# Patient Record
Sex: MALE | Race: BLACK OR AFRICAN AMERICAN | NOT HISPANIC OR LATINO | Employment: UNEMPLOYED | URBAN - METROPOLITAN AREA
[De-identification: names, ages, dates, MRNs, and addresses within clinical notes are randomized per-mention and may not be internally consistent; named-entity substitution may affect disease eponyms.]

---

## 2017-04-25 ENCOUNTER — OFFICE VISIT (OUTPATIENT)
Dept: FAMILY MEDICINE | Facility: CLINIC | Age: 32
End: 2017-04-25
Payer: COMMERCIAL

## 2017-04-25 VITALS
DIASTOLIC BLOOD PRESSURE: 68 MMHG | HEART RATE: 86 BPM | TEMPERATURE: 98.2 F | OXYGEN SATURATION: 94 % | HEIGHT: 68 IN | SYSTOLIC BLOOD PRESSURE: 120 MMHG | WEIGHT: 315 LBS | BODY MASS INDEX: 47.74 KG/M2

## 2017-04-25 DIAGNOSIS — L84 CALLUS OF FOOT: ICD-10-CM

## 2017-04-25 DIAGNOSIS — F17.200 TOBACCO USE DISORDER: ICD-10-CM

## 2017-04-25 DIAGNOSIS — R53.83 FATIGUE, UNSPECIFIED TYPE: ICD-10-CM

## 2017-04-25 DIAGNOSIS — E66.01 MORBID OBESITY, UNSPECIFIED OBESITY TYPE (H): ICD-10-CM

## 2017-04-25 DIAGNOSIS — H54.7 VISION PROBLEM: ICD-10-CM

## 2017-04-25 DIAGNOSIS — I83.93 VARICOSE VEINS OF LEGS: ICD-10-CM

## 2017-04-25 DIAGNOSIS — B35.1 ONYCHOMYCOSIS: ICD-10-CM

## 2017-04-25 DIAGNOSIS — Z13.6 CARDIOVASCULAR SCREENING; LDL GOAL LESS THAN 100: ICD-10-CM

## 2017-04-25 DIAGNOSIS — R73.01 IMPAIRED FASTING GLUCOSE: ICD-10-CM

## 2017-04-25 DIAGNOSIS — J45.20 MILD INTERMITTENT ASTHMA WITHOUT COMPLICATION: Primary | ICD-10-CM

## 2017-04-25 LAB
BASOPHILS # BLD AUTO: 0 10E9/L (ref 0–0.2)
BASOPHILS NFR BLD AUTO: 0.3 %
CHOLEST SERPL-MCNC: 158 MG/DL
DIFFERENTIAL METHOD BLD: ABNORMAL
EOSINOPHIL # BLD AUTO: 0.2 10E9/L (ref 0–0.7)
EOSINOPHIL NFR BLD AUTO: 1.7 %
ERYTHROCYTE [DISTWIDTH] IN BLOOD BY AUTOMATED COUNT: 14.7 % (ref 10–15)
HBA1C MFR BLD: 6 % (ref 4.3–6)
HCT VFR BLD AUTO: 44.2 % (ref 40–53)
HDLC SERPL-MCNC: 37 MG/DL
HGB BLD-MCNC: 14.6 G/DL (ref 13.3–17.7)
LDLC SERPL CALC-MCNC: 85 MG/DL
LYMPHOCYTES # BLD AUTO: 3.5 10E9/L (ref 0.8–5.3)
LYMPHOCYTES NFR BLD AUTO: 25.3 %
MCH RBC QN AUTO: 29.1 PG (ref 26.5–33)
MCHC RBC AUTO-ENTMCNC: 33 G/DL (ref 31.5–36.5)
MCV RBC AUTO: 88 FL (ref 78–100)
MONOCYTES # BLD AUTO: 1.1 10E9/L (ref 0–1.3)
MONOCYTES NFR BLD AUTO: 7.9 %
NEUTROPHILS # BLD AUTO: 8.9 10E9/L (ref 1.6–8.3)
NEUTROPHILS NFR BLD AUTO: 64.8 %
NONHDLC SERPL-MCNC: 121 MG/DL
PLATELET # BLD AUTO: 286 10E9/L (ref 150–450)
RBC # BLD AUTO: 5.02 10E12/L (ref 4.4–5.9)
TRIGL SERPL-MCNC: 178 MG/DL
TSH SERPL DL<=0.005 MIU/L-ACNC: 2.38 MU/L (ref 0.4–4)
WBC # BLD AUTO: 13.8 10E9/L (ref 4–11)

## 2017-04-25 PROCEDURE — 80061 LIPID PANEL: CPT | Performed by: FAMILY MEDICINE

## 2017-04-25 PROCEDURE — 36415 COLL VENOUS BLD VENIPUNCTURE: CPT | Performed by: FAMILY MEDICINE

## 2017-04-25 PROCEDURE — 83036 HEMOGLOBIN GLYCOSYLATED A1C: CPT | Performed by: FAMILY MEDICINE

## 2017-04-25 PROCEDURE — 84443 ASSAY THYROID STIM HORMONE: CPT | Performed by: FAMILY MEDICINE

## 2017-04-25 PROCEDURE — 99214 OFFICE O/P EST MOD 30 MIN: CPT | Performed by: FAMILY MEDICINE

## 2017-04-25 PROCEDURE — 85025 COMPLETE CBC W/AUTO DIFF WBC: CPT | Performed by: FAMILY MEDICINE

## 2017-04-25 RX ORDER — ALBUTEROL SULFATE 0.83 MG/ML
2.5 SOLUTION RESPIRATORY (INHALATION)
COMMUNITY
Start: 2011-05-26 | End: 2017-04-25

## 2017-04-25 RX ORDER — ALBUTEROL SULFATE 0.83 MG/ML
1 SOLUTION RESPIRATORY (INHALATION) EVERY 6 HOURS PRN
Qty: 1 BOX | Refills: 11 | Status: SHIPPED | OUTPATIENT
Start: 2017-04-25

## 2017-04-25 RX ORDER — ALBUTEROL SULFATE 90 UG/1
2 AEROSOL, METERED RESPIRATORY (INHALATION) EVERY 6 HOURS PRN
Qty: 1 INHALER | Refills: 11 | Status: SHIPPED | OUTPATIENT
Start: 2017-04-25

## 2017-04-25 RX ORDER — ALBUTEROL SULFATE 90 UG/1
AEROSOL, METERED RESPIRATORY (INHALATION)
COMMUNITY
End: 2017-04-25

## 2017-04-25 ASSESSMENT — PAIN SCALES - GENERAL: PAINLEVEL: NO PAIN (0)

## 2017-04-25 NOTE — MR AVS SNAPSHOT
After Visit Summary   4/25/2017    Dionne Chaves    MRN: [id number]           Patient Information     Date Of Birth          1985        Visit Information        Provider Department      4/25/2017 8:40 AM Florencio Trevino MD [facility]        Today's Diagnoses     Vision problem    -  1    Mild intermittent asthma without complication        Varicose veins of legs        Onychomycosis        Callus of foot        Morbid obesity, unspecified obesity type (H)        Tobacco use disorder        CARDIOVASCULAR SCREENING; LDL GOAL LESS THAN 100        Fatigue, unspecified type        Impaired fasting glucose          Care Instructions    Okay to monitor the varicose veins and the nail problems    Use [facility] board [facility] to file down the foot calluses occasionally    Albuterol as needed    Keep working on healthy diet/exercise and wt loss    We will send you lab results    Smoking cessation        Follow-ups after your visit        Additional Services     OPTOMETRY REFERRAL       Your provider has referred you to:  FMG: [facility] [phone]   [email or web address]      Please be aware that coverage of these services is subject to the terms and limitations of your health insurance plan.  Call member services at your health plan with any benefit or coverage questions.      Please bring the following to your appointment:  >>   Any x-rays, CTs or MRIs which have been performed.  Contact the facility where they were done to arrange for  prior to your scheduled appointment.  Any new CT, MRI or other procedures ordered by your specialist must be performed at a [place] facility or coordinated by your clinic's referral office.    >>   List of current medications   >>   This referral request   >>   Any documents/labs given to you for this referral                  Who to contact     If you have questions or need follow up  "information about today's clinic visit or your schedule please contact [facility] directly at [phone].  Normal or non-critical lab and imaging results will be communicated to you by AgInfoLinkhart, letter or phone within 4 business days after the clinic has received the results. If you do not hear from us within 7 days, please contact the clinic through AgInfoLinkhart or phone. If you have a critical or abnormal lab result, we will notify you by phone as soon as possible.  Submit refill requests through JAZIO or call your pharmacy and they will forward the refill request to us. Please allow 3 business days for your refill to be completed.          Additional Information About Your Visit        AgInfoLinkhart Information     JAZIO lets you send messages to your doctor, view your test results, renew your prescriptions, schedule appointments and more. To sign up, go to [email or web address] . Click on \"Log in\" on the left side of the screen, which will take you to the Welcome page. Then click on \"Sign up Now\" on the right side of the page.     You will be asked to enter the access code listed below, as well as some personal information. Please follow the directions to create your username and password.     Your access code is: [email or web address]  Expires: 2017  9:02 AM     Your access code will  in 90 days. If you need help or a new code, please call your [place] clinic or [phone].        Care EveryWhere ID     This is your Care EveryWhere ID. This could be used by other organizations to access your [place] medical records  [id number]        Your Vitals Were     Pulse Temperature Height Pulse Oximetry BMI (Body Mass Index)       86 98.2  F (36.8  C) (Oral) 5' 7.75\" (1.721 m) 94% 51.01 kg/m2        Blood Pressure from Last 3 Encounters:   17 120/68   16 133/83   16 132/84    Weight from Last 3 Encounters:   17 (!) 333 lb (151 kg)   16 (!) 323 lb (146.5 kg) "   02/23/16 (!) 327 lb 12 oz (148.7 kg)              We Performed the Following     Asthma Action Plan (AAP)     CBC with platelets differential     Hemoglobin A1c     Lipid panel reflex to direct LDL     OPTOMETRY REFERRAL     TSH with free T4 reflex          Today's Medication Changes          These changes are accurate as of: 4/25/17  9:02 AM.  If you have any questions, ask your nurse or doctor.               These medicines have changed or have updated prescriptions.        Dose/Directions    * albuterol (2.5 MG/3ML) 0.083% neb solution   This may have changed:    - how to take this  - when to take this  - reasons to take this   Used for:  Mild intermittent asthma without complication   Changed by:  Florencio Trevino MD        Dose:  1 vial   Take 1 vial (2.5 mg) by nebulization every 6 hours as needed for shortness of breath / dyspnea or wheezing   Quantity:  1 Box   Refills:  11       * albuterol 108 (90 BASE) MCG/ACT Inhaler   Commonly known as:  PROAIR HFA/PROVENTIL HFA/VENTOLIN HFA   This may have changed:    - how much to take  - when to take this  - reasons to take this   Used for:  Mild intermittent asthma without complication   Changed by:  Florencio Trevino MD        Dose:  2 puff   Inhale 2 puffs into the lungs every 6 hours as needed for shortness of breath / dyspnea or wheezing   Quantity:  1 Inhaler   Refills:  11       * Notice:  This list has 2 medication(s) that are the same as other medications prescribed for you. Read the directions carefully, and ask your doctor or other care provider to review them with you.         Where to get your medicines      These medications were sent to [id number] - [place] [id number] AT [place] [address], [facility] [address]     Phone:  [phone]     albuterol (2.5 MG/3ML) 0.083% neb solution    albuterol 108 (90 BASE) MCG/ACT Inhaler                Primary Care Provider Office Phone #    [place]  [facility] [phone]       [address] [place] [address]        Thank you!     Thank you for choosing [facility]  for your care. Our goal is always to provide you with excellent care. Hearing back from our patients is one way we can continue to improve our services. Please take a few minutes to complete the written survey that you may receive in the mail after your visit with us. Thank you!             Your Updated Medication List - Protect others around you: Learn how to safely use, store and throw away your medicines at www.disposemymeds.org.          This list is accurate as of: 4/25/17  9:02 AM.  Always use your most recent med list.                   Brand Name Dispense Instructions for use    * albuterol (2.5 MG/3ML) 0.083% neb solution     1 Box    Take 1 vial (2.5 mg) by nebulization every 6 hours as needed for shortness of breath / dyspnea or wheezing       * albuterol 108 (90 BASE) MCG/ACT Inhaler    PROAIR HFA/PROVENTIL HFA/VENTOLIN HFA    1 Inhaler    Inhale 2 puffs into the lungs every 6 hours as needed for shortness of breath / dyspnea or wheezing       * Notice:  This list has 2 medication(s) that are the same as other medications prescribed for you. Read the directions carefully, and ask your doctor or other care provider to review them with you.

## 2017-04-25 NOTE — LETTER
My Asthma Action Plan  Name: Dionne Chaves   YOB: 1985  Date: 4/25/2017   My doctor: Florencio Trevino MD   My clinic: [facility]        My Control Medicine: none needed  My Rescue Medicine: albuterol neb/ inhaler   My Asthma Severity: intermittent  Avoid your asthma triggers: see list  None            GREEN ZONE     Good Control    I feel good    No cough or wheeze    Can work, sleep and play without asthma symptoms       Take your asthma control medicine every day.     1. If exercise triggers your asthma, take your rescue medication    15 minutes before exercise or sports, and    During exercise if you have asthma symptoms  2. Spacer to use with inhaler: If you have a spacer, make sure to use it with your inhaler             YELLOW ZONE     Getting Worse  I have ANY of these:    I do not feel good    Cough or wheeze    Chest feels tight    Wake up at night   1. Keep taking your Green Zone medications  2. Start taking your rescue medicine:    every 20 minutes for up to 1 hour. Then every 4 hours for 24-48 hours.  3. If you stay in the Yellow Zone for more than 12-24 hours, contact your doctor.  4. If you do not return to the Green Zone in 12-24 hours or you get worse, start taking your oral steroid medicine if prescribed by your provider.           RED ZONE     Medical Alert - Get Help  I have ANY of these:    I feel awful    Medicine is not helping    Breathing getting harder    Trouble walking or talking    Nose opens wide to breathe       1. Take your rescue medicine NOW  2. If your provider has prescribed an oral steroid medicine, start taking it NOW  3. Call your doctor NOW  4. If you are still in the Red Zone after 20 minutes and you have not reached your doctor:    Take your rescue medicine again and    Call 911 or go to the emergency room right away    See your regular doctor within 2 weeks of an Emergency Room or Urgent Care visit for follow-up treatment.         Electronically signed by: Florencio Trevino, April 25, 2017    Annual Reminders:  Meet with Asthma Educator,  Flu Shot in the Fall, consider Pneumonia Vaccination for patients with asthma (aged 19 and older).    Pharmacy:    [id number] - [place], [place] - [address] AT CORNER OF [address] [place] [address] DRUG STORE [address] [place], [place] - [address] AT [facility] OF [address]                    Asthma Triggers  How To Control Things That Make Your Asthma Worse    Triggers are things that make your asthma worse.  Look at the list below to help you find your triggers and what you can do about them.  You can help prevent asthma flare-ups by staying away from your triggers.      Trigger                                                          What you can do   Cigarette Smoke  Tobacco smoke can make asthma worse. Do not allow smoking in your home, car or around you.  Be sure no one smokes at a child s day care or school.  If you smoke, ask your health care provider for ways to help you quit.  Ask family members to quit too.  Ask your health care provider for a referral to Quit Plan to help you quit smoking, or call 2-427-819-PLAN.     Colds, Flu, Bronchitis  These are common triggers of asthma. Wash your hands often.  Don t touch your eyes, nose or mouth.  Get a flu shot every year.     Dust Mites  These are tiny bugs that live in cloth or carpet. They are too small to see. Wash sheets and blankets in hot water every week.   Encase pillows and mattress in dust mite proof covers.  Avoid having carpet if you can. If you have carpet, vacuum weekly.   Use a dust mask and HEPA vacuum.   Pollen and Outdoor Mold  Some people are allergic to trees, grass, or weed pollen, or molds. Try to keep your windows closed.  Limit time out doors when pollen count is high.   Ask you health care provider about taking medicine during allergy season.     Animal Dander  Some people are allergic to skin flakes, urine or saliva from  pets with fur or feathers. Keep pets with fur or feathers out of your home.    If you can t keep the pet outdoors, then keep the pet out of your bedroom.  Keep the bedroom door closed.  Keep pets off cloth furniture and away from stuffed toys.     Mice, Rats, and Cockroaches  Some people are allergic to the waste from these pests.   Cover food and garbage.  Clean up spills and food crumbs.  Store grease in the refrigerator.   Keep food out of the bedroom.   Indoor Mold  This can be a trigger if your home has high moisture. Fix leaking faucets, pipes, or other sources of water.   Clean moldy surfaces.  Dehumidify basement if it is damp and smelly.   Smoke, Strong Odors, and Sprays  These can reduce air quality. Stay away from strong odors and sprays, such as perfume, powder, hair spray, paints, smoke incense, paint, cleaning products, candles and new carpet.   Exercise or Sports  Some people with asthma have this trigger. Be active!  Ask your doctor about taking medicine before sports or exercise to prevent symptoms.    Warm up for 5-10 minutes before and after sports or exercise.     Other Triggers of Asthma  Cold air:  Cover your nose and mouth with a scarf.  Sometimes laughing or crying can be a trigger.  Some medicines and food can trigger asthma.

## 2017-04-25 NOTE — PATIENT INSTRUCTIONS
Okay to monitor the varicose veins and the nail problems    Use an yuliet board from [facility] to file down the foot calluses occasionally    Albuterol as needed    Keep working on healthy diet/exercise and wt loss    We will send you lab results    Smoking cessation

## 2017-04-25 NOTE — PROGRESS NOTES
SUBJECTIVE:                                                    Dionne Chaves is a 31 year old male who presents to clinic today for the following health issues:       Asthma Follow-Up    Was ACT completed today?    Yes    ACT Total Scores 2/23/2016   ACT TOTAL SCORE -   ASTHMA ER VISITS -   ASTHMA HOSPITALIZATIONS -   ACT TOTAL SCORE (Goal Greater than or Equal to 20) 25   In the past 12 months, how many times did you visit the emergency room for your asthma without being admitted to the hospital? 0   In the past 12 months, how many times were you hospitalized overnight because of your asthma? 0       Recent asthma triggers that patient is dealing with: None        Amount of exercise or physical activity: None    Problems taking medications regularly: No    Medication side effects: none    Diet: regular (no restrictions)      Establish care    Problem list and histories reviewed & adjusted, as indicated.  Additional history: as documented         Reviewed and updated as needed this visit by clinical staff  Allergies  Meds  Problems       Reviewed and updated as needed this visit by Provider          dx at age 4 , usually asthma well controlled    Albuterol prn is only meds, inhaler and neb    Almost out inhaler    Out of neb    Sometimes wheeze from running    Plays basketball    Smokes a bit    Less than 1/2 ppd    Had quit for a while    No other health problems    Some dry irritated skin    Heels of feet are quite  Cracked     Toenail looks funny also on left foot    Wants blood checked    Not healthy diet    [employer]    Physical Exam   Constitutional: He is oriented to person, place, and time and well-developed, well-nourished, and in no distress. No distress.   HENT:   Head: Normocephalic and atraumatic.   Eyes: Conjunctivae are normal.   Neck: Carotid bruit is not present.   Cardiovascular: Normal rate, regular rhythm, normal heart sounds and intact distal pulses.  Exam reveals no gallop and no  friction rub.    No murmur heard.  Pulmonary/Chest: Effort normal and breath sounds normal. No respiratory distress. He has no wheezes. He has no rales.   Musculoskeletal: He exhibits no edema.   Neurological: He is alert and oriented to person, place, and time.   Skin: He is not diaphoretic.   Psychiatric: Mood and affect normal.     Patient has varicose veins of the lower legs but nontender    Thick calluses present on back plantar surface / border of heels    Also moderate onychomycosis of the great nail on examined foot    ASSESSMENT / PLAN:  (J45.20) Mild intermittent asthma without complication  (primary encounter diagnosis)  Comment: patient just uses occasional albuterol  Refilled this.   Plan: Asthma Action Plan (AAP), albuterol (2.5         MG/3ML) 0.083% neb solution, albuterol (PROAIR         HFA/PROVENTIL HFA/VENTOLIN HFA) 108 (90 BASE)         MCG/ACT Inhaler             (E66.01) Morbid obesity, unspecified obesity type (H)  Comment: wt is concerning.  Has been gradually increasing   Plan: keep working on healthy diet/exercise and wt loss     (L84) Callus of foot  Comment: patient will use [employer] to file this down as needed   Plan: as above    (H54.7) Vision problem  Comment: patient can schedule   Plan: OPTOMETRY REFERRAL             (I83.93) Varicose veins of legs  Comment: okay to monitor   Plan: follow up prn     (B35.1) Onychomycosis  Comment: just monitor for now; did discuss possible treatments   Plan: as above     (F17.200) Tobacco use disorder  Comment: discussed in detail   Plan: advised complete cessation     (Z13.6) CARDIOVASCULAR SCREENING; LDL GOAL LESS THAN 100  Comment: check fasting   Plan: Lipid panel reflex to direct LDL             (R53.83) Fatigue, unspecified type  Comment: check   Plan: CBC with platelets differential, TSH with free         T4 reflex             (R73.01) Impaired fasting glucose  Comment: patient states he was borderline for diabetes in past   Plan:  Hemoglobin A1c        Recheck       I reviewed the patient's medications, allergies, medical history, family history, and social history.    Florencio Trevino MD

## 2017-04-25 NOTE — LETTER
"[facility]  [address] [place]  [address]  [phone]        April 27, 2017    Dionne Chaves  [address] [facility] [address]        Dear Dionne,    The triglycerides are still moderately high, but the total and LDL \"bad\" cholesterol are okay.     The hemoglobin a1c shows you are still \"borderline\" for diabetes.     Other labs are stable.     Keep working on healthy diet/exercise and weight loss.     Results for orders placed or performed in visit on 04/25/17   Lipid panel reflex to direct LDL   Result Value Ref Range    Cholesterol 158 <200 mg/dL    Triglycerides 178 (H) <150 mg/dL    HDL Cholesterol 37 (L) >39 mg/dL    LDL Cholesterol Calculated 85 <100 mg/dL    Non HDL Cholesterol 121 <130 mg/dL   Hemoglobin A1c   Result Value Ref Range    Hemoglobin A1C 6.0 4.3 - 6.0 %   CBC with platelets differential   Result Value Ref Range    WBC 13.8 (H) 4.0 - 11.0 10e9/L    RBC Count 5.02 4.4 - 5.9 10e12/L    Hemoglobin 14.6 13.3 - 17.7 g/dL    Hematocrit 44.2 40.0 - 53.0 %    MCV 88 78 - 100 fl    MCH 29.1 26.5 - 33.0 pg    MCHC 33.0 31.5 - 36.5 g/dL    RDW 14.7 10.0 - 15.0 %    Platelet Count 286 150 - 450 10e9/L    Diff Method Automated Method     % Neutrophils 64.8 %    % Lymphocytes 25.3 %    % Monocytes 7.9 %    % Eosinophils 1.7 %    % Basophils 0.3 %    Absolute Neutrophil 8.9 (H) 1.6 - 8.3 10e9/L    Absolute Lymphocytes 3.5 0.8 - 5.3 10e9/L    Absolute Monocytes 1.1 0.0 - 1.3 10e9/L    Absolute Eosinophils 0.2 0.0 - 0.7 10e9/L    Absolute Basophils 0.0 0.0 - 0.2 10e9/L   TSH with free T4 reflex   Result Value Ref Range    TSH 2.38 0.40 - 4.00 mU/L       If you have any questions please call the clinic at [phone].    Sincerely,    Florencio Trevino MD  [facility]  "

## 2017-04-25 NOTE — NURSING NOTE
"Chief Complaint   Patient presents with     Asthma     Health Maintenance     ACT       Initial Pulse 86  Temp 98.2  F (36.8  C) (Oral)  Ht 5' 7.75\" (1.721 m)  Wt (!) 333 lb (151 kg)  SpO2 94%  BMI 51.01 kg/m2 Estimated body mass index is 51.01 kg/(m^2) as calculated from the following:    Height as of this encounter: 5' 7.75\" (1.721 m).    Weight as of this encounter: 333 lb (151 kg).  Medication Reconciliation: complete   Mai Méndez CMA      "

## 2017-04-26 ASSESSMENT — ASTHMA QUESTIONNAIRES: ACT_TOTALSCORE: 25

## 2017-04-27 NOTE — PROGRESS NOTES
"The triglycerides are still moderately high, but the total and LDL \"bad\" cholesterol are okay.    The hemoglobin a1c shows you are still \"borderline\" for diabetes.    Other labs are stable.    Keep working on healthy diet/exercise and weight loss.    Florencio Trevino MD          "

## 2018-05-29 ENCOUNTER — TRANSFERRED RECORDS (OUTPATIENT)
Dept: HEALTH INFORMATION MANAGEMENT | Facility: CLINIC | Age: 33
End: 2018-05-29

## 2019-04-25 ENCOUNTER — TRANSFERRED RECORDS (OUTPATIENT)
Dept: HEALTH INFORMATION MANAGEMENT | Facility: CLINIC | Age: 34
End: 2019-04-25

## 2020-01-27 ENCOUNTER — APPOINTMENT (OUTPATIENT)
Dept: ULTRASOUND IMAGING | Facility: CLINIC | Age: 35
End: 2020-01-27
Attending: NURSE PRACTITIONER

## 2020-01-27 ENCOUNTER — HOSPITAL ENCOUNTER (EMERGENCY)
Facility: CLINIC | Age: 35
Discharge: HOME | End: 2020-01-27
Attending: NURSE PRACTITIONER | Admitting: NURSE PRACTITIONER

## 2020-01-27 VITALS
BODY MASS INDEX: 47.74 KG/M2 | OXYGEN SATURATION: 94 % | WEIGHT: 315 LBS | TEMPERATURE: 100 F | HEIGHT: 68 IN | RESPIRATION RATE: 20 BRPM | SYSTOLIC BLOOD PRESSURE: 162 MMHG | DIASTOLIC BLOOD PRESSURE: 92 MMHG

## 2020-01-27 DIAGNOSIS — R19.7 NAUSEA VOMITING AND DIARRHEA: ICD-10-CM

## 2020-01-27 DIAGNOSIS — R11.2 NAUSEA VOMITING AND DIARRHEA: ICD-10-CM

## 2020-01-27 LAB
ALBUMIN SERPL-MCNC: 3.3 G/DL (ref 3.4–5)
ALP SERPL-CCNC: 62 U/L (ref 40–150)
ALT SERPL W P-5'-P-CCNC: 43 U/L (ref 0–70)
ANION GAP SERPL CALCULATED.3IONS-SCNC: 4 MMOL/L (ref 3–14)
AST SERPL W P-5'-P-CCNC: 20 U/L (ref 0–45)
BASOPHILS # BLD AUTO: 0 10E9/L (ref 0–0.2)
BASOPHILS NFR BLD AUTO: 0.1 %
BILIRUB SERPL-MCNC: 0.3 MG/DL (ref 0.2–1.3)
BUN SERPL-MCNC: 12 MG/DL (ref 7–30)
CALCIUM SERPL-MCNC: 7.8 MG/DL (ref 8.5–10.1)
CHLORIDE SERPL-SCNC: 104 MMOL/L (ref 94–109)
CO2 SERPL-SCNC: 30 MMOL/L (ref 20–32)
CREAT SERPL-MCNC: 1.11 MG/DL (ref 0.66–1.25)
DIFFERENTIAL METHOD BLD: ABNORMAL
EOSINOPHIL # BLD AUTO: 0.1 10E9/L (ref 0–0.7)
EOSINOPHIL NFR BLD AUTO: 0.9 %
ERYTHROCYTE [DISTWIDTH] IN BLOOD BY AUTOMATED COUNT: 14.3 % (ref 10–15)
GFR SERPL CREATININE-BSD FRML MDRD: 85 ML/MIN/{1.73_M2}
GLUCOSE SERPL-MCNC: 101 MG/DL (ref 70–99)
HCT VFR BLD AUTO: 42.5 % (ref 40–53)
HGB BLD-MCNC: 14.1 G/DL (ref 13.3–17.7)
IMM GRANULOCYTES # BLD: 0 10E9/L (ref 0–0.4)
IMM GRANULOCYTES NFR BLD: 0.2 %
LACTATE BLD-SCNC: 1.6 MMOL/L (ref 0.7–2)
LYMPHOCYTES # BLD AUTO: 2.6 10E9/L (ref 0.8–5.3)
LYMPHOCYTES NFR BLD AUTO: 20.6 %
MCH RBC QN AUTO: 29.5 PG (ref 26.5–33)
MCHC RBC AUTO-ENTMCNC: 33.2 G/DL (ref 31.5–36.5)
MCV RBC AUTO: 89 FL (ref 78–100)
MONOCYTES # BLD AUTO: 0.8 10E9/L (ref 0–1.3)
MONOCYTES NFR BLD AUTO: 6.2 %
NEUTROPHILS # BLD AUTO: 9.2 10E9/L (ref 1.6–8.3)
NEUTROPHILS NFR BLD AUTO: 72 %
NRBC # BLD AUTO: 0 10*3/UL
NRBC BLD AUTO-RTO: 0 /100
PLATELET # BLD AUTO: 264 10E9/L (ref 150–450)
POTASSIUM SERPL-SCNC: 3.5 MMOL/L (ref 3.4–5.3)
PROT SERPL-MCNC: 7.1 G/DL (ref 6.8–8.8)
RBC # BLD AUTO: 4.78 10E12/L (ref 4.4–5.9)
SODIUM SERPL-SCNC: 138 MMOL/L (ref 133–144)
WBC # BLD AUTO: 12.8 10E9/L (ref 4–11)

## 2020-01-27 PROCEDURE — 76700 US EXAM ABDOM COMPLETE: CPT

## 2020-01-27 PROCEDURE — 99285 EMERGENCY DEPT VISIT HI MDM: CPT | Mod: 25

## 2020-01-27 PROCEDURE — 96361 HYDRATE IV INFUSION ADD-ON: CPT

## 2020-01-27 PROCEDURE — 83605 ASSAY OF LACTIC ACID: CPT | Performed by: NURSE PRACTITIONER

## 2020-01-27 PROCEDURE — 25800030 ZZH RX IP 258 OP 636: Performed by: NURSE PRACTITIONER

## 2020-01-27 PROCEDURE — 25000128 H RX IP 250 OP 636: Performed by: NURSE PRACTITIONER

## 2020-01-27 PROCEDURE — 80053 COMPREHEN METABOLIC PANEL: CPT | Performed by: NURSE PRACTITIONER

## 2020-01-27 PROCEDURE — 85025 COMPLETE CBC W/AUTO DIFF WBC: CPT | Performed by: NURSE PRACTITIONER

## 2020-01-27 PROCEDURE — 96374 THER/PROPH/DIAG INJ IV PUSH: CPT

## 2020-01-27 RX ORDER — ONDANSETRON 2 MG/ML
4 INJECTION INTRAMUSCULAR; INTRAVENOUS ONCE
Status: COMPLETED | OUTPATIENT
Start: 2020-01-27 | End: 2020-01-27

## 2020-01-27 RX ORDER — ONDANSETRON 4 MG/1
4 TABLET, ORALLY DISINTEGRATING ORAL EVERY 8 HOURS PRN
Qty: 10 TABLET | Refills: 0 | Status: SHIPPED | OUTPATIENT
Start: 2020-01-27 | End: 2020-01-30

## 2020-01-27 RX ADMIN — ONDANSETRON 4 MG: 2 INJECTION INTRAMUSCULAR; INTRAVENOUS at 18:35

## 2020-01-27 RX ADMIN — SODIUM CHLORIDE 1000 ML: 9 INJECTION, SOLUTION INTRAVENOUS at 18:25

## 2020-01-27 ASSESSMENT — ENCOUNTER SYMPTOMS
HEADACHES: 1
NAUSEA: 1
DIARRHEA: 1
BLOOD IN STOOL: 0
ABDOMINAL PAIN: 1
VOMITING: 1
COUGH: 0

## 2020-01-27 ASSESSMENT — MIFFLIN-ST. JEOR: SCORE: 2361.01

## 2020-01-27 NOTE — LETTER
January 27, 2020      To Whom It May Concern:      Dionne Chaves was seen in our Emergency Department today, 01/27/20.  Please excuse Dionne from work on 1/28 and 1/29 due to illness.  He may return to work when improved.    Sincerely,        Kathleen Dao, CNP

## 2020-01-27 NOTE — ED TRIAGE NOTES
Patient reports feeling nauseated and bloated when eating dinner last night.  Emesis and diarrhea since 8p.m. last night and persist throughout the night.  Bilateral lower abdominal pain.

## 2020-01-27 NOTE — ED AVS SNAPSHOT
Emergency Department  [address] [place] [address]  Phone:  [phone]  Fax:  [phone]                                    Dionne Chaves   MRN: [id number]    Department:   Emergency Department   Date of Visit:  1/27/2020           After Visit Summary Signature Page    I have received my discharge instructions, and my questions have been answered. I have discussed any challenges I see with this plan with the nurse or doctor.    ..........................................................................................................................................  Patient/Patient Representative Signature      ..........................................................................................................................................  Patient Representative Print Name and Relationship to Patient    ..................................................               ................................................  Date                                   Time    ..........................................................................................................................................  Reviewed by Signature/Title    ...................................................              ..............................................  Date                                               Time          22EPIC Rev 08/18

## 2020-01-28 NOTE — DISCHARGE INSTRUCTIONS
Discharge Instructions  Vomiting    You have been seen today for vomiting (throwing up). This is usually caused by a virus, but some bacteria, parasites, medicines or other medical conditions can cause similar symptoms. At this time your provider does not find that your vomiting is a sign of anything dangerous or life-threatening. However, sometimes the signs of serious illness do not show up right away. If you have new or worse symptoms, you may need to be seen again in the Emergency Department or by your primary provider. Remember that serious problems like appendicitis can start as vomiting.    Generally, every Emergency Department visit should have a follow-up clinic visit with either a primary or a specialty clinic/provider. Please follow-up as instructed by your emergency provider today.    Return to the Emergency Department if:  You keep vomiting and you are not able to keep liquids down.   You feel you are getting dehydrated, such as being very thirsty, not urinating (peeing) at least every 8-12 hours, or feeling faint or lightheaded.   You develop a new fever, or your fever continues for more than 2 days.   You have abdominal (belly pain) that seems worse than cramps, is in one spot, or is getting worse over time. Appendicitis usually causes pain in the right lower abdomen (to the right and below your belly button) so watch for pain in this location.  You have blood in your vomit or stools.   You feel very weak.  You are not starting to improve within 24 hours of your visit here.     What can I do to help myself?  The most important thing to do is to drink clear liquids. If you have been vomiting a lot, it is best to have only small, frequent sips of liquids. Drinking too much at once may cause more vomiting. If you are vomiting often, you must replace minerals, sodium and potassium lost with your illness. Pedialyte  is the best available rehydration liquid but some find that it doesn t taste good so sports  drinks are an alterative. You can also drink clear liquids such as water, weak tea, apple juice, and 7-Up . Avoid acid liquids (orange), caffeine (coffee) or alcohol. Do not drink milk until you no longer have diarrhea (loose stools).   After liquids are staying down, you may start eating mild foods. Soda crackers, toast, plain noodles, gelatin, applesauce and bananas are good first choices. Avoid foods that have acid, are spicy, fatty or have a lot of fiber (such as meats, coarse grains, vegetables). You may start eating these foods again in about 3 days when you are better.   Sometimes treatment includes prescription medicine to prevent nausea (sick to your stomach) and vomiting. If your provider prescribes these for you, take them as directed.   Do not take ibuprofen, naproxen, or other nonsteroidal anti-inflammatory (NSAID) medicines without checking with your healthcare provider.     If you were given a prescription for medicine here today, be sure to read all of the information (including the package insert) that comes with your prescription.  This will include important information about the medicine, its side effects, and any warnings that you need to know about.  The pharmacist who fills the prescription can provide more information and answer questions you may have about the medicine.  If you have questions or concerns that the pharmacist cannot address, please call or return to the Emergency Department.     Remember that you can always come back to the Emergency Department if you are not able to see your regular provider in the amount of time listed above, if you get any new symptoms, or if there is anything that worries you.    Discharge Instructions  Adult Diarrhea    You have been seen today for diarrhea (loose stools). This is usually caused by a virus, but some bacteria, parasites, medicines, or other medical conditions can cause similar symptoms. At this time your provider does not find that your  diarrhea is a sign of anything dangerous or life-threatening. However, sometimes the signs of serious illness do not show up right away. If you have new or worse symptoms, you may need to be seen again in the Emergency Department or by your primary provider.     Generally, every Emergency Department visit should have a follow-up clinic visit with either a primary or a specialty clinic/provider. Please follow-up as instructed by your emergency provider today.    Return to the Emergency Department if:  You feel you are getting dehydrated, such as being very thirsty, not urinating (peeing) like usual, or feeling faint or lightheaded.   You develop a new fever.  You have abdominal (belly) pain that seems worse than cramps, is in one spot, or is getting worse over time.   You have blood in your stool or your stool becomes black.  (Remember that if you take Pepto-Bismol , this will turn your stool black).   You feel very weak.    What can I do to help myself?  The most important thing to do is to drink clear liquids.   It is best to have only small, frequent sips of liquids. Drinking too much at once may cause more diarrhea. You should also replace minerals, sodium and potassium lost with diarrhea. Pedialyte  and sports drinks can help you replace these minerals. You can also drink clear liquids such as water, weak tea, apple juice, and 7-Up . Avoid acidic liquids (orange juice), caffeine (coffee) or alcohol. Milk products will make the diarrhea worse.  Eat bland (plain) foods. Soda crackers, toast, plain noodles, gelatin, applesauce and bananas are good first choices. Avoid foods that have acid, are spicy, fatty or fibrous (such as meats, coarse grains, vegetables). You may start eating these foods again in about 3 days when you are better.   Sometimes treatment includes prescription medicine to prevent diarrhea. If your provider prescribes these for you, take them as directed.   Nonprescription medicine is available for  "the treatment of diarrhea and can be very effective. If you use it, make sure you use the dose recommended on the package. Check with your healthcare provider before you use any medicine for diarrhea.   Do not take ibuprofen, or other nonsteroidal anti-inflammatory medicines, without checking with your healthcare provider.   Probiotics: If you have been given an antibiotic, you may want to also take a probiotic pill or eat yogurt with live cultures. Probiotics have \"good bacteria\" to help your intestines stay healthy. Studies have shown that probiotics help prevent diarrhea and other intestine problems (including C. diff infection) when you take antibiotics. You can buy these without a prescription in the pharmacy section of the store.   If you were given a prescription for medicine here today, be sure to read all of the information (including the package insert) that comes with your prescription.  This will include important information about the medicine, its side effects, and any warnings that you need to know about.  The pharmacist who fills the prescription can provide more information and answer questions you may have about the medicine.  If you have questions or concerns that the pharmacist cannot address, please call or return to the Emergency Department.  Remember that you can always come back to the Emergency Department if you are not able to see your regular provider in the amount of time listed above, if you get any new symptoms, or if there is anything that worries you.    "

## 2020-01-28 NOTE — ED PROVIDER NOTES
"  History     Chief Complaint:    Nausea, Vomiting, & Diarrhea    The history is provided by the patient.      Dionne Chaves is a 34 year old male with a history of hypertension and asthma who presents with nausea, vomiting, and diarrhea. The patient went out for dinner with his family last night. Immediately after he began eating, he felt like he \"over ate.\" The patient stopped eating and returned home. At home, he felt hungry again, ate, and experienced the same \"full\" sensation. 15 minutes after eating at home he began experiencing nausea, vomiting, and diarrhea around 2000. He reports a headache and abdominal pain as well. The abdominal pain is epigastric and is also in his bilateral lower abdomen. Nobody else in his family is sick. The patient took Imodium at 1100 this morning, which has helped with the diarrhea. He has been drinking Gatorade today and has vomited twice, but has still been able to keep some Gatorade down. The patient denies any fever, scrotal pain, cough, chest pain, shortness of breath, or bloody stools. He also denies any recent antibiotic use or international travel.     Allergies:  No Known Drug Allergies     Medications:    Albuterol nebulizer solution  Albuterol inhaler    Past Medical History:    Asthma  Morbid obesity  Sleep apnea  Hypertension  Tobacco use disorder    Past Surgical History:    The patient does not have any pertinent past surgical history.    Family History:    Obesity - father  Alcohol/drug - father  Respiratory - mother    Social History:  Negative for tobacco use - former smoker, quit 2013.  Negative for alcohol use.  Positive for drug use - marijuana.  Patient accompanied to the ED by his daughter.  Marital Status:  Single [1]     Review of Systems   Respiratory: Negative for cough.    Gastrointestinal: Positive for abdominal pain, diarrhea, nausea and vomiting. Negative for blood in stool.   Genitourinary:        Denies scrotal pain   Neurological: Positive for " "headaches.   All other systems reviewed and are negative.    Physical Exam     Patient Vitals for the past 24 hrs:   BP Temp Temp src Heart Rate Resp SpO2 Height Weight   01/27/20 1654 (!) 162/92 100  F (37.8  C) Temporal 110 20 94 % 1.727 m (5' 8\") 145.2 kg (320 lb)     Physical Exam  Nursing notes reviewed. Vitals reviewed.  General: Alert. Well kept.  Eyes:  Conjunctiva non-injected, non-icteric.  Neck/Throat: Moist mucous membranes, oropharynx without erythema or exudate. No cervical lymphadenopathy.  Normal voice.  Cardiac: Regular rhythm. Normal heart sounds with no murmur/rubs/click.   Pulmonary: Clear and equal breath sounds bilaterally. No crackles/rales. No wheezing  Abdomen: Soft.  Mild diffuse abdominal tenderness throughout.  No masses. No guarding or rebound. No CVA tenderness  Musculoskeletal: Normal gross range of motion of all 4 extremities.    Neurological: Alert and oriented x4.   Skin: Warm and dry without rashes or petechiae. Normal appearance of visualized exposed skin.  Psych: Affect normal. Good eye contact.    Emergency Department Course     Imaging:  Radiology findings were communicated with the patient and family who voiced understanding of the findings.    US Abdomen, Complete:  1. Fatty liver. Enlarged liver.  2. No gallstones. Negative sonographic Dumont's sign, as per radiology.     Laboratory:  Laboratory findings were communicated with the patient and family who voiced understanding of the findings.    Lactic acid whole blood (1846): 1.6  CBC: WBC 12.8 H o/w WNL (HGB 14.1, )  CMP: Glucose 101 H, Calcium 7.8 L, Albumin 3.3 L o/w WNL (Creatinine 1.11)    Interventions:  1825: 0.9% sodium chloride BOLUS 1 L IV  1835: Zofran 4 mg IV    Emergency Department Course:  Past medical records, nursing notes, and vitals reviewed.    1805: I performed an exam of the patient as documented above.     IV was inserted and blood was drawn for laboratory testing, results above.    The patient " was sent for a complete abdomen ultrasound while in the emergency department, results above.     1921: I rechecked the patient and discussed the results of his workup thus far.     2021: Patient rechecked and updated.  Abdomen soft with no tenderness to palpation.  Tolerating PO    I personally reviewed the laboratory and imaging results with the Patient and daughter and answered all related questions prior to discharge.     Findings and plan explained to the Patient and daughter. Patient discharged home with instructions regarding supportive care, medications, and reasons to return. The importance of close follow-up was reviewed.     Impression & Plan     Medical Decision Making:  Dionne Chaves is a 34 year old male who presented to the Emergency Department with nausea, vomiting, and diarrhea.  The clinical exam today is non-specific and non-surgical. The laboratory testing has returned with mild leukocytosis otherwise normal bilirubin and ALT/AST/ALk Phos.  Due to RUQ abdominal pain upon recheck, a complete abdomen ultrasound was obtained and was reassuring showing fatty liver but no gallstones and negative miller sign.  The differential diagnosis of vomiting and diarrhea considered including  Bowel Obstruction, Ulcer, Ischemia, Cholecystitis, Diverticulitis, Pancreatitis, UTI, Pyelonephritis, Enteritis/Colitis, amongst many other etiologies.  The sequela of vomiting includes electrolyte abnormalities and dehydration.  Fluids were given and zofran relieved the nausea.  On recheck, his abdominal pain is very mild and diffuse and no peritoneal signs. No indication for CT/MRI.  Lactate normal and no signs of sepsis.  The history, physical exam, and results detect no life threatening cause at this time, nor do they indicate the patient is currently suffering from one of the previously mentioned conditions.  Unfortunately a clear exam and results today do not ensure freedom from a severe disease process in the  future-- even within hours, or the possibility that there is a dangerous process currently at work but currently undetected or undiagnosed.  For this reason the patient is advised to seek immediate re-evaluation in the ED if there is a worsening of the condition.  He was also encouraged to follow-up with primary care in 1-2 days with ongoing symptoms.     Diagnosis:    ICD-10-CM   1. Nausea vomiting and diarrhea R11.2    R19.7     Disposition:  Discharged to home.    Discharge Medications:  New Prescriptions    ONDANSETRON (ZOFRAN ODT) 4 MG ODT TAB    Take 1 tablet (4 mg) by mouth every 8 hours as needed     Scribe Disclosure:  IKelsi, am serving as a scribe at 6:05 PM on 1/27/2020 to document services personally performed by Kathleen Dao BNP based on my observations and the provider's statements to me.     Kelsi Jones  1/27/2020    EMERGENCY DEPARTMENT       Kathleen Dao CNP  01/27/20 [id number]

## 2023-02-21 ENCOUNTER — APPOINTMENT (OUTPATIENT)
Dept: GENERAL RADIOLOGY | Facility: CLINIC | Age: 38
End: 2023-02-21
Attending: EMERGENCY MEDICINE

## 2023-02-21 ENCOUNTER — APPOINTMENT (OUTPATIENT)
Dept: ULTRASOUND IMAGING | Facility: CLINIC | Age: 38
End: 2023-02-21
Attending: EMERGENCY MEDICINE

## 2023-02-21 ENCOUNTER — HOSPITAL ENCOUNTER (EMERGENCY)
Facility: CLINIC | Age: 38
Discharge: HOME | End: 2023-02-21
Attending: EMERGENCY MEDICINE | Admitting: EMERGENCY MEDICINE
Payer: MEDICAID

## 2023-02-21 VITALS
BODY MASS INDEX: 46.98 KG/M2 | WEIGHT: 310 LBS | SYSTOLIC BLOOD PRESSURE: 145 MMHG | TEMPERATURE: 97.9 F | HEART RATE: 98 BPM | DIASTOLIC BLOOD PRESSURE: 89 MMHG | HEIGHT: 68 IN | OXYGEN SATURATION: 95 % | RESPIRATION RATE: 18 BRPM

## 2023-02-21 DIAGNOSIS — S46.911A MUSCLE STRAIN OF RIGHT UPPER EXTREMITY, INITIAL ENCOUNTER: ICD-10-CM

## 2023-02-21 PROCEDURE — 73090 X-RAY EXAM OF FOREARM: CPT | Mod: RT

## 2023-02-21 PROCEDURE — 250N000013 HC RX MED GY IP 250 OP 250 PS 637: Performed by: EMERGENCY MEDICINE

## 2023-02-21 PROCEDURE — 73060 X-RAY EXAM OF HUMERUS: CPT | Mod: RT

## 2023-02-21 PROCEDURE — 93971 EXTREMITY STUDY: CPT | Mod: RT

## 2023-02-21 PROCEDURE — 99285 EMERGENCY DEPT VISIT HI MDM: CPT | Mod: 25

## 2023-02-21 RX ORDER — NAPROXEN 500 MG/1
500 TABLET ORAL 2 TIMES DAILY WITH MEALS
Qty: 24 TABLET | Refills: 0 | Status: SHIPPED | OUTPATIENT
Start: 2023-02-21 | End: 2023-03-01

## 2023-02-21 RX ORDER — ACETAMINOPHEN 325 MG/1
975 TABLET ORAL ONCE
Status: COMPLETED | OUTPATIENT
Start: 2023-02-21 | End: 2023-02-21

## 2023-02-21 RX ORDER — IBUPROFEN 600 MG/1
600 TABLET, FILM COATED ORAL ONCE
Status: COMPLETED | OUTPATIENT
Start: 2023-02-21 | End: 2023-02-21

## 2023-02-21 RX ADMIN — IBUPROFEN 600 MG: 600 TABLET ORAL at 15:28

## 2023-02-21 RX ADMIN — ACETAMINOPHEN 975 MG: 325 TABLET, FILM COATED ORAL at 15:28

## 2023-02-21 ASSESSMENT — ENCOUNTER SYMPTOMS
JOINT SWELLING: 0
ARTHRALGIAS: 1

## 2023-02-21 NOTE — ED NOTES
Rapid Assessment Note    History:   Dionne Chaves is a 37 year old male who presents with right arm pain. He reports that about 3 weeks ago he slipped on ice and fell on his right side including his arm. He states that his whole right arm is painful from his shoulder to his fingertips. Denies any swelling.     Exam:   General:  Alert, interactive  Cardiovascular:  Well perfused, 2+ radial pulse on right  Lungs:  No respiratory distress, no accessory muscle use  Neuro:  Moving all 4 extremities   Skin:  Warm, dry  Psych:  Normal affect  MS: Diffuse right upper extremity tenderness    Plan of Care:   I evaluated the patient and developed an initial plan of care. I discussed this plan and explained that I, or one of my partners, would be returning to complete the evaluation.         I, Hailee Santiago, am serving as a scribe to document services personally performed by Dr. Yao, based on my observations and the provider's statements to me.    2/21/2023  [facility]    Portions of this medical record were completed by a scribe. UPON MY REVIEW AND AUTHENTICATION BY ELECTRONIC SIGNATURE, this confirms (a) I performed the applicable clinical services, and (b) the record is accurate.        Regis Yao MD  02/21/23 1689

## 2023-02-21 NOTE — ED PROVIDER NOTES
"  History     Chief Complaint:  Arm Pain       HPI   Dionne Chaves is a 37 year old male who presents with right arm pain. He reports that about 3 weeks ago he slipped on ice and fell on his right side including his arm. He states that his whole right arm is painful from his shoulder to his fingertips. Denies any swelling.   He states that the arm feels tight.    Independent Historian:   None - Patient Only    Review of External Notes: outpatient records were reviewed including previous emergency department visits here and elsewhere as well as urgent care visits.    ROS:  Review of Systems   Musculoskeletal: Positive for arthralgias. Negative for joint swelling.   All other systems reviewed and are negative.      Allergies:  No Known Allergies     Medications:    Albuterol    Past Medical History:    Asthma  Morbid obesity  Sleep apnea    Family History:    Father - obesity, alcohol/drug  Mother- respiratory    Social History:  The patient presents to the ED alone.  Via private car    Physical Exam     Patient Vitals for the past 24 hrs:   BP Temp Temp src Pulse Resp SpO2 Height Weight   02/21/23 1522 (!) 145/89 97.9  F (36.6  C) Oral 98 18 95 % 1.727 m (5' 8\") 140.6 kg (310 lb)        Physical Exam    General: Alert, interactive in mild distress  Head:  Scalp is atraumatic  Eyes:  The pupils are equal, round, and reactive to light    EOM's intact    No scleral icterus  ENT:      Nose:  The external nose is normal  Ears:  External ears are normal  Mouth/Throat: The oropharynx is normal    Mucus membranes are moist       Neck:  Normal range of motion.      There is no rigidity.    Trachea is in the midline         CV:  Regular rate and rhythm    2+ radial pulse on the right  Resp:  Breath sounds are clear bilaterally    Non-labored, no retractions or accessory muscle use     MS:  Normal strength in all 4 extremities, No midline cervical tenderness. Diffuse right upper extremity tenderness. Full passive ROM of " shoulder, elbow and wrist. No erythema or warmth  Skin:  Warm and dry, No rash or lesions noted.  Neuro: Strength 5/5 x4.  Sensation intact in the bilateral upper extremities     GCS: 15  Psych:  Awake. Alert.  Normal affect.      Appropriate interactions.      Emergency Department Course     Imaging:  US Upper Extremity Venous Duplex Right   Final Result   IMPRESSION: There is no evidence for DVT in the right upper extremity.      SUSANA CASTELLON MD            SYSTEM ID:  [id number]      Radius/Ulna XR, PA & LAT, right   Final Result   IMPRESSION: There is no evidence of an acute fracture of the right   humerus or forearm. No malalignment. Mild distal triceps tendon   enthesopathy.      MOE MAYA MD            SYSTEM ID:  [id number]      Humerus XR, G/E 2 views, right   Final Result   IMPRESSION: There is no evidence of an acute fracture of the right   humerus or forearm. No malalignment. Mild distal triceps tendon   enthesopathy.      MOE MAYA MD            SYSTEM ID:  [id number]         Report per radiology    Emergency Department Course & Assessments:           Interventions:  Medications   acetaminophen (TYLENOL) tablet 975 mg (975 mg Oral Given 2/21/23 1528)   ibuprofen (ADVIL/MOTRIN) tablet 600 mg (600 mg Oral Given 2/21/23 1528)        Independent Interpretation (X-rays, CTs, rhythm strip):  I reviewed radiographs and see no fractures or dislocations.     Social Determinants of Health affecting care:   None    Assessments:  1526 I obtained history and examined the patient as noted above.  1642 I rechecked the patient and explained findings.    Disposition:  The patient was discharged to home.     Impression & Plan    Due to hospital and departmental capacity constraints and prolonged wait times, this patient was evaluated in non-traditional circumstances such as in triage/waiting room, a hallway, etc. I explained the option to wait for a traditional treatment space and apologized for the  inconvenience. Given the circumstances, every attempt was made to provide for the patient's comfort and privacy and to perform the most thorough evaluation possible.    Medical Decision Making:  Following presentation history and physical examination were performed, the above work-up was undertaken.  Radiographs demonstrate no signs of fracture.  Ultrasound demonstrates no signs of DVT.  On examination there is no signs of a skin infection, septic arthritis, myositis, or more concerning illness.  I do not believe he needs any further work-up at this time.  I recommended follow-up with an orthopedic surgeon for possible physical therapy, he was placed in a sling for comfort and prescribed naproxen for breakthrough pain.  He will return if new symptoms develop.  Patient was agreement this plan subsequently discharged home.    Diagnosis:    ICD-10-CM    1. Muscle strain of right upper extremity, initial encounter  S46.911A            Discharge Medications:  Discharge Medication List as of 2/21/2023  4:51 PM      START taking these medications    Details   naproxen (NAPROSYN) 500 MG tablet Take 1 tablet (500 mg) by mouth 2 times daily (with meals) for 8 days, Disp-24 tablet, R-0, E-Prescribe                Scribe Disclosure:  I, Hailee Santiago, am serving as a scribe at 4:47 PM on 2/21/2023 to document services personally performed by Regis Yao MD based on my observations and the provider's statements to me.     2/21/2023   Regis Yao MD Trigger, Brandon Thomas, MD  02/21/23 [id number]

## 2023-02-21 NOTE — LETTER
February 21, 2023      To Whom It May Concern:      Dionne KNIGHT Anastacio was seen in our Emergency Department today, 02/21/23.  I expect his condition to improve over the next 3days.  He may return to work/school when improved.    Sincerely,

## 2025-03-19 ENCOUNTER — HOSPITAL ENCOUNTER (EMERGENCY)
Facility: CLINIC | Age: 40
Discharge: HOME | End: 2025-03-19
Attending: STUDENT IN AN ORGANIZED HEALTH CARE EDUCATION/TRAINING PROGRAM | Admitting: STUDENT IN AN ORGANIZED HEALTH CARE EDUCATION/TRAINING PROGRAM
Payer: COMMERCIAL

## 2025-03-19 VITALS
TEMPERATURE: 97.4 F | SYSTOLIC BLOOD PRESSURE: 139 MMHG | HEART RATE: 95 BPM | RESPIRATION RATE: 18 BRPM | OXYGEN SATURATION: 96 % | WEIGHT: 303 LBS | DIASTOLIC BLOOD PRESSURE: 93 MMHG | BODY MASS INDEX: 45.92 KG/M2 | HEIGHT: 68 IN

## 2025-03-19 DIAGNOSIS — S93.402A SPRAIN OF LEFT ANKLE, UNSPECIFIED LIGAMENT, INITIAL ENCOUNTER: ICD-10-CM

## 2025-03-19 PROCEDURE — 99283 EMERGENCY DEPT VISIT LOW MDM: CPT

## 2025-03-19 ASSESSMENT — COLUMBIA-SUICIDE SEVERITY RATING SCALE - C-SSRS
6. HAVE YOU EVER DONE ANYTHING, STARTED TO DO ANYTHING, OR PREPARED TO DO ANYTHING TO END YOUR LIFE?: NO
2. HAVE YOU ACTUALLY HAD ANY THOUGHTS OF KILLING YOURSELF IN THE PAST MONTH?: NO
1. IN THE PAST MONTH, HAVE YOU WISHED YOU WERE DEAD OR WISHED YOU COULD GO TO SLEEP AND NOT WAKE UP?: NO

## 2025-03-19 ASSESSMENT — ACTIVITIES OF DAILY LIVING (ADL)
ADLS_ACUITY_SCORE: 41
ADLS_ACUITY_SCORE: 41

## 2025-03-19 NOTE — DISCHARGE INSTRUCTIONS
Return to the emergency department if symptoms are worsening, become concerning, or for any other concerns. Follow-up with your doctor routinely.  If you have persistent inability to walk on the foot in 10 to 14 days, consider getting repeat x-ray.    Discharge Instructions  Ankle Sprain    An ankle sprain is a stretching or tearing of a ligament around your ankle joint. In most cases, we recommend resting the ankle for about 3 days, followed by return to activity. Some severe sprains need longer periods of rest, or can require a cast or boot to immobilize them.    Generally, every Emergency Department visit should have a follow-up clinic visit with either a primary or a specialty clinic/provider. Please follow-up as instructed by your emergency provider today.    Return to the Emergency Department if:  Your pain is much worse, or if there is pain in a new area.  Your foot or leg becomes pale, cool, blue, or numb or tingling.  There is anything concerning to you about how your ankle looks.  Any splint or device is feeling too tight, causing pain, or rubbing into your skin.    Follow-up with your provider:  As recommended by your emergency provider.  If your ankle is not back to normal within about 1 week.  If you are involved in significant athletic activities.        Treatment:  Apply ice your injured area for 15 minutes at a time, at least 3 times a day for the first 1-2 days. Use a cloth between the ice bag and your skin to prevent frostbite.   Do not sleep with an ice pack or heating pad on, since this can cause burns or skin injury.  Raise the injured area above the level of your heart as much as possible in the first 1-2 days.  Pain medications -- You may take a pain medication such as Tylenol  (acetaminophen), Advil , Nuprin  (ibuprofen) or Aleve  (naproxen).  Splint. We often give a stirrup-shaped ankle splint to support your ankle and prevent it from turning again. Wear this all the time for the first 3-5  days, and then as directed by your provider.  Crutches. If you cannot put weight on the ankle without a lot of pain, we recommend crutches. You can put as much weight on the ankle as possible without severe pain.   Compression. An elastic bandage (Ace  wrap) can help with pain and swelling. Remove this at least twice a day, and leave it off for several hours if you develop swelling of the foot.   Exercises.  Movements, like rotating the foot in circles, should be started when swelling improves.   If you were given a prescription for medicine here today, be sure to read all of the information (including the package insert) that comes with your prescription.  This will include important information about the medicine, its side effects, and any warnings that you need to know about.  The pharmacist who fills the prescription can provide more information and answer questions you may have about the medicine.  If you have questions or concerns that the pharmacist cannot address, please call or return to the Emergency Department.  Remember that you can always come back to the Emergency Department if you are not able to see your regular provider in the amount of time listed above, if you get any new symptoms, or if there is anything that worries you.

## 2025-03-19 NOTE — ED TRIAGE NOTES
Patient states fell down a couple of steps at 12am .  Denies hitting head.  Pain, injury to left ankle & foot.      Triage Assessment (Adult)       Row Name 03/19/25 0341          Triage Assessment    Airway WDL WDL        Respiratory WDL    Respiratory WDL WDL        Cardiac WDL    Cardiac WDL WDL        Cognitive/Neuro/Behavioral WDL    Cognitive/Neuro/Behavioral WDL WDL

## 2025-03-19 NOTE — ED PROVIDER NOTES
"  Emergency Department Note      History of Present Illness     Chief Complaint   Ankle Pain      HPI   Dionne Chaves is a 39 year old male not anticoagulated, presents after tripping down the last 2 steps while walking to get his medications at midnight.  Patient landed weird and felt a pop in his left ankle/foot and has pain in this region.  Reports not being able to walk now.  Intact sensation.  Denies striking head.  Denies any other injuries.    Independent Historian   None    Review of External Notes   none    Past Medical History     Medical History and Problem List   Past Medical History:   Diagnosis Date    Asthma     Morbid obesity (H)     Sleep apnea        Medications   albuterol (2.5 MG/3ML) 0.083% neb solution  albuterol (PROAIR HFA/PROVENTIL HFA/VENTOLIN HFA) 108 (90 BASE) MCG/ACT Inhaler        Surgical History   Past Surgical History:   Procedure Laterality Date    NO HISTORY OF SURGERY         Physical Exam     Patient Vitals for the past 24 hrs:   BP Temp Temp src Pulse Resp SpO2 Height Weight   03/19/25 0356 132/74 97.4  F (36.3  C) Oral -- 18 94 % -- --   03/19/25 0343 (!) 151/95 97.6  F (36.4  C) Temporal 104 16 99 % 1.727 m (5' 8\") (!) 137.4 kg (303 lb)     Physical Exam  GENERAL: Patient well-appearing  HEAD: Atraumatic  EYES: Anicteric  NECK:  No rigidity  PULM: Speaking in full sentences without difficulty. No evidence of respiratory distress.  DERM: No visible rash.  EXTREMITY: Generalized tenderness to palpation throughout the distal left ankle and foot.  No open fracture.  No crepitus.  Compartment soft and pliable to the left calf.  Negative Jeffries's test of left Achilles.  Can fully flex and extend the left knee without pain.  Vascular: Left DP pulse 2+.  Left foot warm and well-perfused.  Neuro: Sensations intact to light touch throughout the left foot.  Patient can flex and extend at the left ankle but full range of motion limited by pain.      Diagnostics     Lab Results "   Labs Ordered and Resulted from Time of ED Arrival to Time of ED Departure - No data to display    Imaging   XR Ankle Left G/E 3 Views   Final Result   IMPRESSION: No soft tissue swelling about the ankle. No acute fracture of the lateral, medial, or posterior malleolus, but there are possible old appearing deformities of the lateral and medial malleolus. Normal talar dome and syndesmotic clear space.      Foot XR, G/E 3 views, left   Final Result   IMPRESSION: Normal joint spaces and alignment. No fracture. Atypical appearance at the base of the fifth metatarsal may reflect an old fracture. Correlate with tenderness to palpation.             Independent Interpretation   X-ray foot and ankle no fracture.    ED Course      Medications Administered   Medications - No data to display    Procedures   Procedures     Discussion of Management   None    ED Course        Additional Documentation  None    Medical Decision Making / Diagnosis     CMS Diagnoses: None    MIPS       None    MDM   Dionne Chaves is a 39 year old male     Symptoms most consistent with ankle sprain.      DDx fracture, dislocation, vascular injury, however evaluation not consistent with these etiologies.    X-ray foot and ankle negative for acute process.  He has old appearing deformities in the foot and ankle.  I discussed with patient and he has had a complicated fracture in the past.    Recommended activity as tolerated.  Given air stirrup and crutches.    Recommended ibuprofen and Tylenol as needed and elevation and icing.    Patient was evaluated for acute medical emergencies. Based on my clinical assessment, I do not think any further acute management or work-up is required.  Patient stable for discharge. Given strict return precautions. All questions answered. Patient content with plan. Recommended PCP follow-up routinely.  Discussed if persistent inability to bear weight in 10 to 14 days to consider repeat x-ray.      Disposition   The  patient was discharged.     Diagnosis     ICD-10-CM    1. Sprain of left ankle, unspecified ligament, initial encounter  S93.402A Ankle/Foot Bracing Supplies Order Air Ankle Stirrup Brace; Left     Crutches Order           Discharge Medications   New Prescriptions    No medications on file         MD Malia Betts Kevin, MD  03/19/25 3258

## 2025-03-19 NOTE — Clinical Note
Dionne Chaves was seen and treated in our emergency department on 3/19/2025.  He may return to work on 03/21/2025.       If you have any questions or concerns, please don't hesitate to call.      Abel Finley MD